# Patient Record
Sex: FEMALE | Race: WHITE | NOT HISPANIC OR LATINO | Employment: FULL TIME | URBAN - METROPOLITAN AREA
[De-identification: names, ages, dates, MRNs, and addresses within clinical notes are randomized per-mention and may not be internally consistent; named-entity substitution may affect disease eponyms.]

---

## 2019-03-08 ENCOUNTER — HOSPITAL ENCOUNTER (EMERGENCY)
Facility: HOSPITAL | Age: 24
Discharge: HOME | End: 2019-03-08
Attending: EMERGENCY MEDICINE | Admitting: EMERGENCY MEDICINE

## 2019-03-08 ENCOUNTER — APPOINTMENT (OUTPATIENT)
Dept: GENERAL RADIOLOGY | Facility: HOSPITAL | Age: 24
End: 2019-03-08

## 2019-03-08 VITALS
DIASTOLIC BLOOD PRESSURE: 79 MMHG | OXYGEN SATURATION: 100 % | RESPIRATION RATE: 16 BRPM | TEMPERATURE: 98.4 F | BODY MASS INDEX: 33.12 KG/M2 | WEIGHT: 194 LBS | HEIGHT: 64 IN | SYSTOLIC BLOOD PRESSURE: 125 MMHG | HEART RATE: 84 BPM

## 2019-03-08 DIAGNOSIS — R00.2 HEART PALPITATIONS: Primary | ICD-10-CM

## 2019-03-08 DIAGNOSIS — R07.89 DISCOMFORT IN CHEST: ICD-10-CM

## 2019-03-08 DIAGNOSIS — R03.0 ELEVATED BLOOD PRESSURE READING: ICD-10-CM

## 2019-03-08 LAB
ALBUMIN SERPL-MCNC: 4.67 G/DL (ref 3.2–4.8)
ALBUMIN/GLOB SERPL: 1.8 G/DL (ref 1.5–2.5)
ALP SERPL-CCNC: 64 U/L (ref 25–100)
ALT SERPL W P-5'-P-CCNC: 22 U/L (ref 7–40)
ANION GAP SERPL CALCULATED.3IONS-SCNC: 9 MMOL/L (ref 3–11)
AST SERPL-CCNC: 19 U/L (ref 0–33)
BASOPHILS # BLD AUTO: 0.01 10*3/MM3 (ref 0–0.2)
BASOPHILS NFR BLD AUTO: 0.1 % (ref 0–1)
BILIRUB SERPL-MCNC: 0.5 MG/DL (ref 0.3–1.2)
BNP SERPL-MCNC: 8 PG/ML (ref 0–100)
BUN BLD-MCNC: 10 MG/DL (ref 9–23)
BUN/CREAT SERPL: 15.2 (ref 7–25)
CALCIUM SPEC-SCNC: 9.8 MG/DL (ref 8.7–10.4)
CHLORIDE SERPL-SCNC: 104 MMOL/L (ref 99–109)
CO2 SERPL-SCNC: 26 MMOL/L (ref 20–31)
CREAT BLD-MCNC: 0.66 MG/DL (ref 0.6–1.3)
DEPRECATED RDW RBC AUTO: 43.2 FL (ref 37–54)
EOSINOPHIL # BLD AUTO: 0.03 10*3/MM3 (ref 0–0.3)
EOSINOPHIL NFR BLD AUTO: 0.4 % (ref 0–3)
ERYTHROCYTE [DISTWIDTH] IN BLOOD BY AUTOMATED COUNT: 12.8 % (ref 11.3–14.5)
GFR SERPL CREATININE-BSD FRML MDRD: 109 ML/MIN/1.73
GLOBULIN UR ELPH-MCNC: 2.6 GM/DL
GLUCOSE BLD-MCNC: 82 MG/DL (ref 70–100)
HCT VFR BLD AUTO: 41.2 % (ref 34.5–44)
HGB BLD-MCNC: 13.7 G/DL (ref 11.5–15.5)
HOLD SPECIMEN: NORMAL
HOLD SPECIMEN: NORMAL
IMM GRANULOCYTES # BLD AUTO: 0.02 10*3/MM3 (ref 0–0.05)
IMM GRANULOCYTES NFR BLD AUTO: 0.3 % (ref 0–0.6)
LIPASE SERPL-CCNC: 48 U/L (ref 6–51)
LYMPHOCYTES # BLD AUTO: 2.59 10*3/MM3 (ref 0.6–4.8)
LYMPHOCYTES NFR BLD AUTO: 33.3 % (ref 24–44)
MCH RBC QN AUTO: 30.7 PG (ref 27–31)
MCHC RBC AUTO-ENTMCNC: 33.3 G/DL (ref 32–36)
MCV RBC AUTO: 92.4 FL (ref 80–99)
MONOCYTES # BLD AUTO: 0.42 10*3/MM3 (ref 0–1)
MONOCYTES NFR BLD AUTO: 5.4 % (ref 0–12)
NEUTROPHILS # BLD AUTO: 4.73 10*3/MM3 (ref 1.5–8.3)
NEUTROPHILS NFR BLD AUTO: 60.8 % (ref 41–71)
PLATELET # BLD AUTO: 253 10*3/MM3 (ref 150–450)
PMV BLD AUTO: 9.9 FL (ref 6–12)
POTASSIUM BLD-SCNC: 3.4 MMOL/L (ref 3.5–5.5)
PROT SERPL-MCNC: 7.3 G/DL (ref 5.7–8.2)
RBC # BLD AUTO: 4.46 10*6/MM3 (ref 3.89–5.14)
SODIUM BLD-SCNC: 139 MMOL/L (ref 132–146)
T4 FREE SERPL-MCNC: 1.21 NG/DL (ref 0.89–1.76)
TROPONIN I SERPL-MCNC: 0 NG/ML (ref 0–0.07)
TROPONIN I SERPL-MCNC: <0.006 NG/ML
TSH SERPL DL<=0.05 MIU/L-ACNC: 0.65 MIU/ML (ref 0.35–5.35)
WBC NRBC COR # BLD: 7.78 10*3/MM3 (ref 3.5–10.8)
WHOLE BLOOD HOLD SPECIMEN: NORMAL
WHOLE BLOOD HOLD SPECIMEN: NORMAL

## 2019-03-08 PROCEDURE — 83880 ASSAY OF NATRIURETIC PEPTIDE: CPT

## 2019-03-08 PROCEDURE — 93005 ELECTROCARDIOGRAM TRACING: CPT | Performed by: EMERGENCY MEDICINE

## 2019-03-08 PROCEDURE — 85025 COMPLETE CBC W/AUTO DIFF WBC: CPT

## 2019-03-08 PROCEDURE — 84484 ASSAY OF TROPONIN QUANT: CPT

## 2019-03-08 PROCEDURE — 80053 COMPREHEN METABOLIC PANEL: CPT

## 2019-03-08 PROCEDURE — 83690 ASSAY OF LIPASE: CPT

## 2019-03-08 PROCEDURE — 84443 ASSAY THYROID STIM HORMONE: CPT | Performed by: EMERGENCY MEDICINE

## 2019-03-08 PROCEDURE — 99283 EMERGENCY DEPT VISIT LOW MDM: CPT

## 2019-03-08 PROCEDURE — 71045 X-RAY EXAM CHEST 1 VIEW: CPT

## 2019-03-08 PROCEDURE — 84439 ASSAY OF FREE THYROXINE: CPT | Performed by: EMERGENCY MEDICINE

## 2019-03-08 RX ORDER — SODIUM CHLORIDE 0.9 % (FLUSH) 0.9 %
10 SYRINGE (ML) INJECTION AS NEEDED
Status: DISCONTINUED | OUTPATIENT
Start: 2019-03-08 | End: 2019-03-08 | Stop reason: HOSPADM

## 2019-03-09 NOTE — ED PROVIDER NOTES
Subjective   Donavan Gonzalez is a 23 y.o.female who presents to the emergency department with complaints of palpitations. Today her heart rate dropped to 39 and then immediately increased to 142. She has had episodes of rapidly alternating bradycardia and tachycardia in the past with a heart rate ranging anywhere from 55 to 135 although today she feels more symptomatic than usual. She says that her heart rate has been at 66 or below for hours now as compared to her baseline of 85. She has been seen by primary care with the same complaint in the past but is here for a second opinion. She is pain free on arrival. She denies any recent stressors that may be causing her symptoms. The patient has already had a normal cardiac workup including an echocardiogram and EKG. There are no other acute complaints at this time.        History provided by:  Patient  Palpitations   Palpitations quality:  Slow (intermittently fast)  Onset quality:  Sudden  Duration:  1 day  Timing:  Constant  Progression:  Unchanged  Chronicity:  Recurrent  Relieved by:  None tried  Worsened by:  Nothing  Ineffective treatments:  None tried  Associated symptoms: no chest pain    Risk factors: no stress        Review of Systems   Cardiovascular: Positive for palpitations. Negative for chest pain.   All other systems reviewed and are negative.      No past medical history on file.    Allergies   Allergen Reactions   • Soybean-Containing Drug Products Other (See Comments)     headache       No past surgical history on file.    No family history on file.    Social History     Socioeconomic History   • Marital status:      Spouse name: Not on file   • Number of children: Not on file   • Years of education: Not on file   • Highest education level: Not on file         Objective   Physical Exam   Constitutional: She is oriented to person, place, and time. She appears well-developed and well-nourished. No distress.   HENT:   Head: Normocephalic and  atraumatic.   Eyes: Conjunctivae are normal. No scleral icterus.   Neck: Normal range of motion. Neck supple.   Cardiovascular: Normal rate, regular rhythm, normal heart sounds and intact distal pulses.   No murmur heard.  Pulmonary/Chest: Effort normal and breath sounds normal. No respiratory distress.   Abdominal: Soft. There is no tenderness.   Musculoskeletal: She exhibits no edema.   Neurological: She is alert and oriented to person, place, and time.   Skin: Skin is warm and dry. No erythema.   Psychiatric: She has a normal mood and affect. Her behavior is normal.   Nursing note and vitals reviewed.      Procedures         ED Course  ED Course as of Mar 09 0100   Fri Mar 08, 2019   1922 Troponin I: <0.006 [RS]   1922 BNP: 8.0 [RS]   2000 Troponin I: 0.00 [RS]   2000 TSH Baseline: 0.652 [RS]   2000 Free T4: 1.21 [RS]   2000 No emergent findings.  We will plan to discharge the patient to follow-up with the monitor and rhythm clinic.  Patient voices understanding and is agreeable with this plan.  [RS]      ED Course User Index  [RS] Giorgi Grande MD     Recent Results (from the past 24 hour(s))   Comprehensive Metabolic Panel    Collection Time: 03/08/19  4:32 PM   Result Value Ref Range    Glucose 82 70 - 100 mg/dL    BUN 10 9 - 23 mg/dL    Creatinine 0.66 0.60 - 1.30 mg/dL    Sodium 139 132 - 146 mmol/L    Potassium 3.4 (L) 3.5 - 5.5 mmol/L    Chloride 104 99 - 109 mmol/L    CO2 26.0 20.0 - 31.0 mmol/L    Calcium 9.8 8.7 - 10.4 mg/dL    Total Protein 7.3 5.7 - 8.2 g/dL    Albumin 4.67 3.20 - 4.80 g/dL    ALT (SGPT) 22 7 - 40 U/L    AST (SGOT) 19 0 - 33 U/L    Alkaline Phosphatase 64 25 - 100 U/L    Total Bilirubin 0.5 0.3 - 1.2 mg/dL    eGFR Non African Amer 109 >60 mL/min/1.73    Globulin 2.6 gm/dL    A/G Ratio 1.8 1.5 - 2.5 g/dL    BUN/Creatinine Ratio 15.2 7.0 - 25.0    Anion Gap 9.0 3.0 - 11.0 mmol/L   Lipase    Collection Time: 03/08/19  4:32 PM   Result Value Ref Range    Lipase 48 6 - 51 U/L   BNP     Collection Time: 03/08/19  4:32 PM   Result Value Ref Range    BNP 8.0 0.0 - 100.0 pg/mL   Light Blue Top    Collection Time: 03/08/19  4:32 PM   Result Value Ref Range    Extra Tube hold for add-on    Green Top (Gel)    Collection Time: 03/08/19  4:32 PM   Result Value Ref Range    Extra Tube Hold for add-ons.    Lavender Top    Collection Time: 03/08/19  4:32 PM   Result Value Ref Range    Extra Tube hold for add-on    Gold Top - SST    Collection Time: 03/08/19  4:32 PM   Result Value Ref Range    Extra Tube Hold for add-ons.    CBC Auto Differential    Collection Time: 03/08/19  4:32 PM   Result Value Ref Range    WBC 7.78 3.50 - 10.80 10*3/mm3    RBC 4.46 3.89 - 5.14 10*6/mm3    Hemoglobin 13.7 11.5 - 15.5 g/dL    Hematocrit 41.2 34.5 - 44.0 %    MCV 92.4 80.0 - 99.0 fL    MCH 30.7 27.0 - 31.0 pg    MCHC 33.3 32.0 - 36.0 g/dL    RDW 12.8 11.3 - 14.5 %    RDW-SD 43.2 37.0 - 54.0 fl    MPV 9.9 6.0 - 12.0 fL    Platelets 253 150 - 450 10*3/mm3    Neutrophil % 60.8 41.0 - 71.0 %    Lymphocyte % 33.3 24.0 - 44.0 %    Monocyte % 5.4 0.0 - 12.0 %    Eosinophil % 0.4 0.0 - 3.0 %    Basophil % 0.1 0.0 - 1.0 %    Immature Grans % 0.3 0.0 - 0.6 %    Neutrophils, Absolute 4.73 1.50 - 8.30 10*3/mm3    Lymphocytes, Absolute 2.59 0.60 - 4.80 10*3/mm3    Monocytes, Absolute 0.42 0.00 - 1.00 10*3/mm3    Eosinophils, Absolute 0.03 0.00 - 0.30 10*3/mm3    Basophils, Absolute 0.01 0.00 - 0.20 10*3/mm3    Immature Grans, Absolute 0.02 0.00 - 0.05 10*3/mm3   Troponin    Collection Time: 03/08/19  4:32 PM   Result Value Ref Range    Troponin I <0.006 <=0.039 ng/mL   TSH    Collection Time: 03/08/19  4:32 PM   Result Value Ref Range    TSH 0.652 0.350 - 5.350 mIU/mL   T4, Free    Collection Time: 03/08/19  4:32 PM   Result Value Ref Range    Free T4 1.21 0.89 - 1.76 ng/dL   POC Troponin, Rapid    Collection Time: 03/08/19  7:19 PM   Result Value Ref Range    Troponin I 0.00 0.00 - 0.07 ng/mL     Note: In addition to lab results  "from this visit, the labs listed above may include labs taken at another facility or during a different encounter within the last 24 hours. Please correlate lab times with ED admission and discharge times for further clarification of the services performed during this visit.    XR Chest 1 View   Final Result   No acute findings.       DICTATED:   3/8/2019   EDITED/ls :   3/8/2019        This report was finalized on 3/8/2019 6:56 PM by Werner Quintanilla.            Vitals:    03/08/19 1613 03/08/19 1911 03/08/19 1950 03/08/19 2023   BP: 124/72 125/79     BP Location: Left arm      Patient Position: Sitting      Pulse: 64  73 84   Resp: 16      Temp: 98.4 °F (36.9 °C)      TempSrc: Oral      SpO2: 100%  100% 100%   Weight: 88 kg (194 lb)      Height: 162.6 cm (64\")        Medications - No data to display  ECG/EMG Results (last 24 hours)     Procedure Component Value Units Date/Time    ECG 12 Lead [[id number]] Collected:  03/08/19 1630     Updated:  03/08/19 1630        ECG 12 Lead   Final Result   Test Reason : chest pain   Blood Pressure : **/** mmHG   Vent. Rate : 070 BPM     Atrial Rate : 070 BPM      P-R Int : 126 ms          QRS Dur : 090 ms       QT Int : 408 ms       P-R-T Axes : 006 064 045 degrees      QTc Int : 440 ms      Normal sinus rhythm with sinus arrhythmia   Normal ECG   No previous ECGs available   Confirmed by CHARLETTE LÓPEZ MD ([id number]) on 3/9/2019 12:42:59 AM      Referred By:  JULIUS DOUGLAS           Confirmed By:CHARLETTE LÓPEZ MD      ECG 12 Lead   Final Result   Test Reason : 2nd set   Blood Pressure : **/** mmHG   Vent. Rate : 069 BPM     Atrial Rate : 069 BPM      P-R Int : 142 ms          QRS Dur : 094 ms       QT Int : 400 ms       P-R-T Axes : 024 063 045 degrees      QTc Int : 428 ms      Normal sinus rhythm with sinus arrhythmia   When compared with ECG of 08-MAR-2019 16:30, (Unconfirmed)   No significant change was found   Confirmed by CHARLETTE LÓPEZ MD ([id number]) on 3/9/2019 12:42:51 AM      Referred By:  JULIUS " MD           Confirmed By:GIORGI GRANDE MD                         MDM  Number of Diagnoses or Management Options  Discomfort in chest:   Elevated blood pressure reading:   Heart palpitations:      Amount and/or Complexity of Data Reviewed  Clinical lab tests: reviewed  Tests in the radiology section of CPT®: reviewed  Independent visualization of images, tracings, or specimens: yes        Final diagnoses:   Heart palpitations   Elevated blood pressure reading   Discomfort in chest       Documentation assistance provided by aayush Mixon.  Information recorded by the scribe was done at my direction and has been verified and validated by me.     Aurelia Mixon  03/08/19 2001       Aurelia Mixon  03/08/19 2247       Giorgi Grande MD  03/09/19 0103

## 2019-03-11 NOTE — PROGRESS NOTES
"[facility]      Encounter Date:03/12/2019     Donavan Gonzalez  [address] [place] [id number]  [unfilled]    1995    Provider, No Known    Donavan Gonzalez is a 23 y.o. female.      Subjective:     Chief Complaint:  Palpitations     HPI   Patient is a 23-year-old female with past medical history significant for PTSD who presents to the [facility] for evaluation of palpitations at the request of Dr. Grande.  Patient presented to the ED on 3/8/2019 with acute on chronic palpitations reporting that her heart rate dropped to 39 and immediately increased to 142.  She reports episodes of alternating bradycardia and tachycardia for years with heart rates ranging from  bpm and wanted to come to [facility] for a second opinion.   The patient has been evaluated by her PCP and has had an echo in the past and 3 day holter a few months ago by [facility]. She said she only wore holter for a couple of days because she was told to take it off early because it was all normal.  She reports she was told that it was likely anxiety from a cardiologist.  However, she reports episodes of tachycardia occur while she was relaxed.  Episodes happen with laying down, sitting and standing and feels like something is \"pulsating through her body\".  She has associated nausea, dizziness and presyncope.  Denies sarah syncope.  However, reports that during episodes she feels very close to passing out and  witnesses will tell her that she is very pale. She does have a history of anxiety and PTSD and is accompanied by a service dog. She reports that she has a service dog because her PTSD is so severe that she will sometime \"dissociate and black out\" and for hypoglycemia and tachycardia. Her dog has been trained to sense when her heart rates drop below 55 and increase above 95. She also complains of worsening joint pain and states all of her joints are \"subluxing\". Patient " drinks adequate amount of water. No excessive caffeine. Occasional wine which helps her joint pain    There is no problem list on file for this patient.      History reviewed. No pertinent past medical history.    History reviewed. No pertinent surgical history.    History reviewed. No pertinent family history.    Social History     Socioeconomic History   • Marital status:      Spouse name: Not on file   • Number of children: Not on file   • Years of education: Not on file   • Highest education level: Not on file   Social Needs   • Financial resource strain: Not on file   • Food insecurity - worry: Not on file   • Food insecurity - inability: Not on file   • Transportation needs - medical: Not on file   • Transportation needs - non-medical: Not on file   Occupational History   • Occupation: [employer]   Tobacco Use   • Smoking status: Never Smoker   • Smokeless tobacco: Never Used   Substance and Sexual Activity   • Alcohol use: Yes     Alcohol/week: 0.6 oz     Types: 1 Glasses of wine per week     Frequency: 2-3 times a week   • Drug use: No   • Sexual activity: Not on file   Other Topics Concern   • Not on file   Social History Narrative    Caffeine: 3 serving per week.       Allergies   Allergen Reactions   • Soybean-Containing Drug Products Other (See Comments)     headache       No current outpatient medications on file.    The following portions of the patient's history were reviewed and updated as appropriate: allergies, current medications, past family history, past medical history, past social history, past surgical history and problem list.    Review of Systems   Constitution: Positive for fever, weakness and malaise/fatigue. Negative for chills.   HENT: Negative.    Eyes: Negative.    Cardiovascular: Positive for dyspnea on exertion, irregular heartbeat, near-syncope, palpitations and paroxysmal nocturnal dyspnea. Negative for chest pain, claudication, cyanosis, leg swelling, orthopnea and  "syncope.   Respiratory: Negative for cough, shortness of breath and snoring.    Endocrine: Positive for cold intolerance and heat intolerance.   Hematologic/Lymphatic: Bruises/bleeds easily.   Skin: Negative for poor wound healing.   Musculoskeletal: Positive for joint pain and muscle weakness.   Gastrointestinal: Positive for heartburn and nausea. Negative for abdominal pain, hematemesis, melena and vomiting.   Genitourinary: Negative.  Negative for hematuria.   Neurological: Positive for loss of balance.   Psychiatric/Behavioral: Negative.    Allergic/Immunologic: Negative.        Objective:     Vitals:    03/12/19 1049 03/12/19 1056 03/12/19 1059   BP: 120/71 114/69 122/80   BP Location: Right arm Left arm Left arm   Patient Position: Sitting Sitting Standing   Pulse: 88  99   Resp: 16     Temp: 98.2 °F (36.8 °C)     TempSrc: Temporal     SpO2: 95%     Weight: 91.9 kg (202 lb 9.6 oz)     Height: 162.6 cm (64\")         Physical Exam   Constitutional: She is oriented to person, place, and time. She appears well-developed and well-nourished. No distress.   HENT:   Head: Normocephalic.   Eyes: Conjunctivae are normal. Pupils are equal, round, and reactive to light.   Neck: Neck supple. No JVD present. No thyromegaly present.   Cardiovascular: Normal rate, regular rhythm, normal heart sounds and intact distal pulses. Exam reveals no gallop and no friction rub.   No murmur heard.  Pulmonary/Chest: Effort normal and breath sounds normal. No respiratory distress. She has no wheezes. She has no rales. She exhibits no tenderness.   Abdominal: Soft. Bowel sounds are normal.   Musculoskeletal: Normal range of motion. She exhibits no edema.   Neurological: She is alert and oriented to person, place, and time.   Skin: Skin is warm and dry.   Psychiatric: She has a normal mood and affect. Her behavior is normal. Thought content normal.   Vitals reviewed.      Lab and Diagnostic Review:  Results for orders placed or performed " during the hospital encounter of 03/08/19   Comprehensive Metabolic Panel   Result Value Ref Range    Glucose 82 70 - 100 mg/dL    BUN 10 9 - 23 mg/dL    Creatinine 0.66 0.60 - 1.30 mg/dL    Sodium 139 132 - 146 mmol/L    Potassium 3.4 (L) 3.5 - 5.5 mmol/L    Chloride 104 99 - 109 mmol/L    CO2 26.0 20.0 - 31.0 mmol/L    Calcium 9.8 8.7 - 10.4 mg/dL    Total Protein 7.3 5.7 - 8.2 g/dL    Albumin 4.67 3.20 - 4.80 g/dL    ALT (SGPT) 22 7 - 40 U/L    AST (SGOT) 19 0 - 33 U/L    Alkaline Phosphatase 64 25 - 100 U/L    Total Bilirubin 0.5 0.3 - 1.2 mg/dL    eGFR Non African Amer 109 >60 mL/min/1.73    Globulin 2.6 gm/dL    A/G Ratio 1.8 1.5 - 2.5 g/dL    BUN/Creatinine Ratio 15.2 7.0 - 25.0    Anion Gap 9.0 3.0 - 11.0 mmol/L   Lipase   Result Value Ref Range    Lipase 48 6 - 51 U/L   BNP   Result Value Ref Range    BNP 8.0 0.0 - 100.0 pg/mL   CBC Auto Differential   Result Value Ref Range    WBC 7.78 3.50 - 10.80 10*3/mm3    RBC 4.46 3.89 - 5.14 10*6/mm3    Hemoglobin 13.7 11.5 - 15.5 g/dL    Hematocrit 41.2 34.5 - 44.0 %    MCV 92.4 80.0 - 99.0 fL    MCH 30.7 27.0 - 31.0 pg    MCHC 33.3 32.0 - 36.0 g/dL    RDW 12.8 11.3 - 14.5 %    RDW-SD 43.2 37.0 - 54.0 fl    MPV 9.9 6.0 - 12.0 fL    Platelets 253 150 - 450 10*3/mm3    Neutrophil % 60.8 41.0 - 71.0 %    Lymphocyte % 33.3 24.0 - 44.0 %    Monocyte % 5.4 0.0 - 12.0 %    Eosinophil % 0.4 0.0 - 3.0 %    Basophil % 0.1 0.0 - 1.0 %    Immature Grans % 0.3 0.0 - 0.6 %    Neutrophils, Absolute 4.73 1.50 - 8.30 10*3/mm3    Lymphocytes, Absolute 2.59 0.60 - 4.80 10*3/mm3    Monocytes, Absolute 0.42 0.00 - 1.00 10*3/mm3    Eosinophils, Absolute 0.03 0.00 - 0.30 10*3/mm3    Basophils, Absolute 0.01 0.00 - 0.20 10*3/mm3    Immature Grans, Absolute 0.02 0.00 - 0.05 10*3/mm3   Troponin   Result Value Ref Range    Troponin I <0.006 <=0.039 ng/mL   TSH   Result Value Ref Range    TSH 0.652 0.350 - 5.350 mIU/mL   T4, Free   Result Value Ref Range    Free T4 1.21 0.89 - 1.76 ng/dL   POC  Troponin, Rapid   Result Value Ref Range    Troponin I 0.00 0.00 - 0.07 ng/mL     EKG: NSR with sinus arrhythmia    Assessment and Plan:   1. Palpitations  - Cardiac Event Monitor; Future  - Obtain previous echo and holter    2. Tachycardia  - Tilt Table to rule out POTs    3. Near syncope  - Tilt Table; Future    4. PTSD  - Service dog  - No meds  - Anxiety seems to be controlled, patient feels unrelated to palpitations and near syncope    RV in 6 weeks    It has been a pleasure to participate in the care of this patient.  Patient was instructed to call the [facility] with any questions, concerns, or worsening symptoms.    *Please note that portions of this note were completed with a voice recognition program. Efforts were made to edit the dictations, but occasionally words are mistranscribed.

## 2019-03-12 ENCOUNTER — HOSPITAL ENCOUNTER (OUTPATIENT)
Dept: CARDIOLOGY | Facility: HOSPITAL | Age: 24
Discharge: HOME | End: 2019-03-12
Admitting: NURSE PRACTITIONER

## 2019-03-12 ENCOUNTER — OFFICE VISIT (OUTPATIENT)
Dept: CARDIOLOGY | Facility: HOSPITAL | Age: 24
End: 2019-03-12

## 2019-03-12 VITALS
HEART RATE: 99 BPM | SYSTOLIC BLOOD PRESSURE: 122 MMHG | DIASTOLIC BLOOD PRESSURE: 80 MMHG | TEMPERATURE: 98.2 F | BODY MASS INDEX: 34.59 KG/M2 | OXYGEN SATURATION: 95 % | WEIGHT: 202.6 LBS | RESPIRATION RATE: 16 BRPM | HEIGHT: 64 IN

## 2019-03-12 DIAGNOSIS — R00.0 TACHYCARDIA: ICD-10-CM

## 2019-03-12 DIAGNOSIS — R55 NEAR SYNCOPE: ICD-10-CM

## 2019-03-12 DIAGNOSIS — R00.2 PALPITATIONS: ICD-10-CM

## 2019-03-12 DIAGNOSIS — F43.10 PTSD (POST-TRAUMATIC STRESS DISORDER): ICD-10-CM

## 2019-03-12 DIAGNOSIS — R00.2 PALPITATIONS: Primary | ICD-10-CM

## 2019-03-12 PROCEDURE — 93270 REMOTE 30 DAY ECG REV/REPORT: CPT

## 2019-03-13 ENCOUNTER — TELEPHONE (OUTPATIENT)
Dept: CARDIOLOGY | Facility: HOSPITAL | Age: 24
End: 2019-03-13

## 2019-03-13 NOTE — TELEPHONE ENCOUNTER
----- Message from Jolie Naidu MA sent at 3/13/2019  9:19 AM EDT -----      ----- Message -----  From: Luz Marina Velasco APRN  Sent: 3/12/2019  11:58 AM  To: Jolie Naidu MA    Will you get all cardiac records from [facility]?    Thank you!

## 2019-03-14 ENCOUNTER — TELEPHONE (OUTPATIENT)
Dept: CARDIOLOGY | Facility: HOSPITAL | Age: 24
End: 2019-03-14

## 2019-03-14 ENCOUNTER — HOSPITAL ENCOUNTER (OUTPATIENT)
Dept: CARDIOLOGY | Facility: HOSPITAL | Age: 24
Discharge: HOME | End: 2019-03-14
Admitting: NURSE PRACTITIONER

## 2019-03-14 VITALS
WEIGHT: 202.6 LBS | DIASTOLIC BLOOD PRESSURE: 72 MMHG | HEART RATE: 70 BPM | BODY MASS INDEX: 34.59 KG/M2 | HEIGHT: 64 IN | SYSTOLIC BLOOD PRESSURE: 102 MMHG

## 2019-03-14 DIAGNOSIS — R00.0 TACHYCARDIA: ICD-10-CM

## 2019-03-14 DIAGNOSIS — R55 NEAR SYNCOPE: ICD-10-CM

## 2019-03-14 LAB — BH CV TILT AGENT USED: NORMAL

## 2019-03-14 PROCEDURE — 93660 TILT TABLE EVALUATION: CPT

## 2019-03-14 PROCEDURE — 93660 TILT TABLE EVALUATION: CPT | Performed by: INTERNAL MEDICINE

## 2019-03-14 RX ADMIN — SODIUM CHLORIDE 1 MCG/MIN: 0.9 INJECTION, SOLUTION INTRAVENOUS at 09:00

## 2019-03-27 ENCOUNTER — TELEPHONE (OUTPATIENT)
Dept: CARDIOLOGY | Facility: HOSPITAL | Age: 24
End: 2019-03-27

## 2019-03-27 NOTE — TELEPHONE ENCOUNTER
----- Message from LARRY Conklin sent at 3/12/2019 11:58 AM EDT -----  Will you get all cardiac records from [facility]?    Thank you!

## 2019-04-10 PROCEDURE — 93272 ECG/REVIEW INTERPRET ONLY: CPT | Performed by: INTERNAL MEDICINE

## 2019-04-18 PROBLEM — F43.10 PTSD (POST-TRAUMATIC STRESS DISORDER): Status: ACTIVE | Noted: 2019-04-18

## 2019-04-18 PROBLEM — F41.9 ANXIETY: Status: ACTIVE | Noted: 2019-04-18

## 2019-04-18 PROBLEM — R00.0 SINUS TACHYCARDIA: Status: ACTIVE | Noted: 2019-04-18

## 2019-04-18 NOTE — PROGRESS NOTES
"[facility]      Encounter Date:03/12/2019     Donavan Edgar  [address] [place] [id number]  [unfilled]    1995    Provider, No Known    Donavan Edgar is a 23 y.o. female.      Subjective:     Chief Complaint:  Palpitations     HPI   Patient is a 23-year-old female with past medical history significant for PTSD, OCD, panic disorder, anxiety and depression who presents to the [facility] to follow up on palpitations.  She has a history of intermittent tachycardia and was evaluated by [facility] cardiology a few months ago. She had a normal echo and wore event monitor for about 3 days which showed intermittent sinus tachycardia.  She wore event monitor a little over 10 days which showed symptomatic sinus tachycardia.  Average heart rate was 90.  Tilt table was normal.  She reports since her last visit she was diagnosed with fibromyalgia.  She feels that chronic pain may be triggering tachycardic episodes.  She continues to have some intermittent presyncopal episodes which are relieved by her service dog laying on her chest and by her laying down.  She is increasing her physical activity with activity such as hiking and walking and trying to lose weight.  He notes that she avoids medications because she is \"medication sensitive\". Psychological issues have been managed by a service dog since she has had so many side effects to medications    Patient Active Problem List   Diagnosis   • PTSD (post-traumatic stress disorder)   • Anxiety   • Sinus tachycardia       Past Medical History:   Diagnosis Date   • Fibromyalgia        History reviewed. No pertinent surgical history.    History reviewed. No pertinent family history.    Social History     Socioeconomic History   • Marital status:      Spouse name: Not on file   • Number of children: Not on file   • Years of education: Not on file   • Highest education level: Not on file " "  Occupational History   • Occupation: Apportable   Tobacco Use   • Smoking status: Never Smoker   • Smokeless tobacco: Never Used   Substance and Sexual Activity   • Alcohol use: Yes     Alcohol/week: 0.6 oz     Types: 1 Glasses of wine per week     Frequency: 2-3 times a week   • Drug use: No   Social History Narrative    Caffeine: 3 serving per week.       Allergies   Allergen Reactions   • Soybean-Containing Drug Products Other (See Comments)     headache       No current outpatient medications on file.    The following portions of the patient's history were reviewed and updated as appropriate: allergies, current medications, past family history, past medical history, past social history, past surgical history and problem list.    Review of Systems   Constitution: Positive for weakness and malaise/fatigue. Negative for chills and fever.   HENT: Negative.    Eyes: Negative.    Cardiovascular: Positive for dyspnea on exertion, irregular heartbeat, near-syncope and palpitations. Negative for chest pain, claudication, cyanosis, leg swelling, orthopnea and syncope.   Respiratory: Negative for cough, shortness of breath and snoring.    Endocrine: Positive for cold intolerance and heat intolerance.   Hematologic/Lymphatic: Bruises/bleeds easily.   Skin: Negative for poor wound healing.   Musculoskeletal: Positive for joint pain and muscle weakness.   Gastrointestinal: Positive for abdominal pain, heartburn and nausea. Negative for hematemesis, melena and vomiting.   Genitourinary: Negative.  Negative for hematuria.   Neurological: Positive for loss of balance.   Psychiatric/Behavioral: Negative.    Allergic/Immunologic: Negative.        Objective:     Vitals:    04/19/19 0847   BP: 113/68   BP Location: Right arm   Patient Position: Sitting   Pulse: 67   Resp: 16   Temp: 97.5 °F (36.4 °C)   TempSrc: Temporal   SpO2: 97%   Weight: 91.6 kg (202 lb)   Height: 162.6 cm (64.02\")       Physical Exam   Constitutional: She " is oriented to person, place, and time. She appears well-developed and well-nourished. No distress.   HENT:   Head: Normocephalic.   Eyes: Conjunctivae are normal. Pupils are equal, round, and reactive to light.   Neck: Neck supple. No JVD present. No thyromegaly present.   Cardiovascular: Normal rate, regular rhythm, normal heart sounds and intact distal pulses. Exam reveals no gallop and no friction rub.   No murmur heard.  Pulmonary/Chest: Effort normal and breath sounds normal. No respiratory distress. She has no wheezes. She has no rales. She exhibits no tenderness.   Abdominal: Soft. Bowel sounds are normal.   Musculoskeletal: Normal range of motion. She exhibits no edema.   Neurological: She is alert and oriented to person, place, and time.   Skin: Skin is warm and dry.   Psychiatric: She has a normal mood and affect. Her behavior is normal. Thought content normal.   Vitals reviewed.      Lab and Diagnostic Review:   Echo 11/2018 at [facility] showed normal LVEF of 60%. No significant valve disease    Event monitor 11/2018 at [facility] with only 57 usable hours showed an average heart rate of 87, symptomatic sinus tachycardia that was present for 14% of the readable data.  No bradycardia. Patient did report passing out and rhythm was sinus tachycardia/NSR    3/14/19  · Negative tilt table study.  · Upright tilt reproduced lightheadedness and shortness of breath without vasodepressor or cardioinhibitory response.  Event monitor 3/2019  Diagnostic time was 10 days and 5 hours.  They were infrequent PACs/PVCs.  Triggered events were sinus tachycardia and normal sinus rhythm.  Tachycardia was present 30% of the time.  Average heart rate was 90, minimum heart rate 48, max 155  Assessment and Plan:   1. Sinus tachycardia  - No evidence of POTs on TTT  - Inappropriate sinus tachycardia likely secondary to physical and emotional stress. Discussed possible medications such as beta blockers and CCB but she  is concerned that they will exacerbate her mental illness.  Patient reassured that there appears to be no underlying heart problem.  Recommend that she address chronic emotional and physical stress with nonpharmacological therapies and pharmacological therapies if needed      3. Near syncope  - Tilt Table negative  Advised to remain adequately hydrated (at least 2L of water), judicious salt intake, moderate strength compression stockings, avoid prolonged standing, and regular aerobic exercise using a recumbent bike    3. PTSD/anxiety/depression  - Service dog  - No meds  - Anxiety seems to be controlled, patient feels unrelated to palpitations and near syncope    RV PRN    It has been a pleasure to participate in the care of this patient.  Patient was instructed to call the [facility] with any questions, concerns, or worsening symptoms.    *Please note that portions of this note were completed with a voice recognition program. Efforts were made to edit the dictations, but occasionally words are mistranscribed.

## 2019-04-19 ENCOUNTER — OFFICE VISIT (OUTPATIENT)
Dept: CARDIOLOGY | Facility: HOSPITAL | Age: 24
End: 2019-04-19

## 2019-04-19 VITALS
HEIGHT: 64 IN | SYSTOLIC BLOOD PRESSURE: 113 MMHG | HEART RATE: 67 BPM | OXYGEN SATURATION: 97 % | BODY MASS INDEX: 34.49 KG/M2 | DIASTOLIC BLOOD PRESSURE: 68 MMHG | TEMPERATURE: 97.5 F | WEIGHT: 202 LBS | RESPIRATION RATE: 16 BRPM

## 2019-04-19 DIAGNOSIS — R00.0 SINUS TACHYCARDIA: Primary | ICD-10-CM

## 2019-04-19 PROCEDURE — 99214 OFFICE O/P EST MOD 30 MIN: CPT | Performed by: NURSE PRACTITIONER
